# Patient Record
Sex: MALE | Race: WHITE
[De-identification: names, ages, dates, MRNs, and addresses within clinical notes are randomized per-mention and may not be internally consistent; named-entity substitution may affect disease eponyms.]

---

## 2018-02-18 ENCOUNTER — HOSPITAL ENCOUNTER (EMERGENCY)
Dept: HOSPITAL 80 - FED | Age: 63
Discharge: HOME | End: 2018-02-18
Payer: COMMERCIAL

## 2018-02-18 VITALS
HEART RATE: 74 BPM | DIASTOLIC BLOOD PRESSURE: 71 MMHG | RESPIRATION RATE: 18 BRPM | TEMPERATURE: 98.1 F | OXYGEN SATURATION: 95 % | SYSTOLIC BLOOD PRESSURE: 119 MMHG

## 2018-02-18 DIAGNOSIS — L03.811: Primary | ICD-10-CM

## 2018-02-18 DIAGNOSIS — Z87.891: ICD-10-CM

## 2018-02-18 NOTE — EDPHY
H & P


Time Seen by Provider: 02/18/18 18:06


HPI/ROS: 





CHIEF COMPLAINT: "I think I have an infection on my head"





HISTORY OF PRESENT ILLNESS: 63-year-old immunocompetent male with up-to-date 

tetanus states that 12 days ago he was wearing a hat, impacted a metal edge 

sustained an abrasion.  He has noticed progressive erythema and he started to 

notice drainage on the vertex of his head.  Today is Sunday.  He was seen at 

[facility] Friday for URI symptoms started on Bactrim.  He notes that 

the drainage continues from his scalp.  He denies:  Headache, nausea, vomiting, 

fever, chills, visual disturbance, nuchal rigidity.  





PRIMARY CARE PROVIDER:  





REVIEW OF SYSTEMS:


A ten point review of systems was performed and is negative with the exception 

of the items mentioned in the HPI





************


PHYSICAL EXAM





(Prior to examination, patient consented to physical exam, hands were washed 

and my usual and customary physical exam procedures followed)


1) GENERAL: Well-developed, well-nourished, alert and oriented.  Appears to be 

in no acute distress.


2) HEAD: Normocephalic


3) HEENT: sclera anicteric   


4) LUNGS: Breathing comfortably.   


5) SKIN:  On the vertex of the patient's scalp has an area of erythema which is 

currently draining.  There are no new areas of fluctuance I can identify.  No 

extension to the face. 


6) NEURO: Awake, alert, and oriented to person, place and time.  Answers 

questions appropriately.  There were no obvious focal neurologic abnormalities.

  No cerebellar dysfunction.  Normal steady gait.  Upper and lower extremities 

bilaterally with strength 5 / 5, reflexes 2+.








Smoking Status: Former smoker


Constitutional: 





 Initial Vital Signs











Temperature (C)  36.7 C   02/18/18 18:12


 


Heart Rate  74   02/18/18 18:12


 


Respiratory Rate  18   02/18/18 18:12


 


Blood Pressure  119/71   02/18/18 18:12


 


O2 Sat (%)  95   02/18/18 18:12








 











O2 Delivery Mode               Room Air














Allergies/Adverse Reactions: 


 





No Known Allergies Allergy (Unverified 12/30/10 19:31)


 








Home Medications: 














 Medication  Instructions  Recorded


 


AMBIEN  12/30/10


 


Vicodin ES  12/30/10


 


Cephalexin [Keflex] 500 mg PO TID 10 Days  cap 02/18/18














MDM/Departure





- [facility]


ED Course/Re-evaluation: 





I do not think this patient is septic.  Doubt intracranial pathology.  I do not 

think that imaging studies are indicated at this time.  I am adding Keflex was 

medication regimen.  Recommend continued warm packs on the area.  I do not 

identify new areas of fluctuance requiring incision drainage however patient 

has been informed that this may be indicated at a future time.  Doubt 

necrotizing fasciitis.  [facility] with [facility] 

tomorrow.  Usual and customary discharge precautions instructions provided.  He 

feels comfortable being discharged.  Care of patient under supervision of  

secondary supervising physician Dr Singleton . 





- Depart


Disposition: Home, Routine, Self-Care


Clinical Impression: 


 Cellulitis of scalp





Condition: Good


Instructions:  Cellulitis (ED)


Additional Instructions: 


Return to the ER if you develop headaches, nausea, vomiting, problems walking, 

worsening symptoms or any other symptoms that concern you


Prescriptions: 


Cephalexin [Keflex] 500 mg PO TID 10 Days  cap


Referrals: 


[facility] [Other] - 1 day without fail